# Patient Record
Sex: FEMALE | Race: BLACK OR AFRICAN AMERICAN | NOT HISPANIC OR LATINO | Employment: FULL TIME | URBAN - METROPOLITAN AREA
[De-identification: names, ages, dates, MRNs, and addresses within clinical notes are randomized per-mention and may not be internally consistent; named-entity substitution may affect disease eponyms.]

---

## 2018-10-04 PROBLEM — R20.0 NUMBNESS AND TINGLING OF RIGHT UPPER AND LOWER EXTREMITY: Status: ACTIVE | Noted: 2018-10-04

## 2018-10-04 PROBLEM — R20.2 NUMBNESS AND TINGLING OF RIGHT UPPER AND LOWER EXTREMITY: Status: ACTIVE | Noted: 2018-10-04

## 2018-10-05 ENCOUNTER — OUTSIDE PLACE OF SERVICE (OUTPATIENT)
Dept: CARDIOLOGY | Facility: CLINIC | Age: 58
End: 2018-10-05
Payer: MEDICAID

## 2018-10-05 PROCEDURE — 93306 TTE W/DOPPLER COMPLETE: CPT | Mod: 26,,, | Performed by: INTERNAL MEDICINE

## 2018-10-25 ENCOUNTER — LAB VISIT (OUTPATIENT)
Dept: LAB | Facility: HOSPITAL | Age: 58
End: 2018-10-25
Attending: PSYCHIATRY & NEUROLOGY
Payer: MEDICAID

## 2018-10-25 ENCOUNTER — OFFICE VISIT (OUTPATIENT)
Dept: NEUROLOGY | Facility: CLINIC | Age: 58
End: 2018-10-25
Payer: MEDICAID

## 2018-10-25 VITALS — DIASTOLIC BLOOD PRESSURE: 77 MMHG | SYSTOLIC BLOOD PRESSURE: 118 MMHG | WEIGHT: 203.5 LBS | HEART RATE: 91 BPM

## 2018-10-25 DIAGNOSIS — G45.9 TIA (TRANSIENT ISCHEMIC ATTACK): ICD-10-CM

## 2018-10-25 DIAGNOSIS — G45.9 TIA (TRANSIENT ISCHEMIC ATTACK): Primary | ICD-10-CM

## 2018-10-25 LAB
ESTIMATED AVG GLUCOSE: 108 MG/DL
HBA1C MFR BLD HPLC: 5.4 %

## 2018-10-25 PROCEDURE — 99213 OFFICE O/P EST LOW 20 MIN: CPT | Mod: PBBFAC,PO | Performed by: PSYCHIATRY & NEUROLOGY

## 2018-10-25 PROCEDURE — 99214 OFFICE O/P EST MOD 30 MIN: CPT | Mod: S$PBB,,, | Performed by: PSYCHIATRY & NEUROLOGY

## 2018-10-25 PROCEDURE — 36415 COLL VENOUS BLD VENIPUNCTURE: CPT

## 2018-10-25 PROCEDURE — 83036 HEMOGLOBIN GLYCOSYLATED A1C: CPT

## 2018-10-25 PROCEDURE — 99999 PR PBB SHADOW E&M-EST. PATIENT-LVL III: CPT | Mod: PBBFAC,,, | Performed by: PSYCHIATRY & NEUROLOGY

## 2018-10-25 RX ORDER — FOLIC ACID 1 MG/1
1 TABLET ORAL DAILY
COMMUNITY

## 2018-10-25 RX ORDER — LOSARTAN POTASSIUM 50 MG/1
50 TABLET ORAL DAILY
COMMUNITY
End: 2021-05-07

## 2018-10-25 RX ORDER — ASPIRIN 325 MG
81 TABLET, DELAYED RELEASE (ENTERIC COATED) ORAL DAILY
COMMUNITY

## 2018-10-25 RX ORDER — ATORVASTATIN CALCIUM 40 MG/1
40 TABLET, FILM COATED ORAL NIGHTLY
COMMUNITY

## 2018-10-25 NOTE — PATIENT INSTRUCTIONS
Transient Ischemic Attack (TIA)     Your symptoms were caused by a TIA, or mini-stroke. Even though your symptoms have gone away, this condition is as serious as a full stroke. It means you are more likely to have a full stroke. About 1 in 3 people who have a TIA go on to have a full stroke. And 4% to 10% of those people will have the stroke within 2 days.  A TIA is caused when something decreases or blocks blood flow to a part of your brain. A TIA often happens when a blood clot travels to a blood vessel in the brain. The clot reduces or blocks blood flow. This causes the symptoms you had. After a short while, the clot dissolves. Blood flows again, and the symptoms go away. People with hardening of the arteries (atherosclerosis) are at higher risk for a TIA. So are people who have an irregular heartbeat called atrial fibrillation.  A TIA causes symptoms similar to a stroke, but they last less than 24 hours. A full stroke causes symptoms that last more than 24 hours and may be permanent. But even if your symptoms only lasted a short time, the TIA may have damaged your brain tissue. Once you have had a TIA, you are at risk of having a full stroke. You will need tests to look at the blood flow to your brain. The tests can also rule out other causes of your symptoms. The tests may include an ultrasound of the arteries in your neck and an evaluation of your heart. They may also include a CT scan of your brain, an MRI scan of your brain, or both. If your healthcare provider finds problems, he or she will recommend treatment with medicines, procedures, or both.  Your provider may prescribe medicines to reduce your chance of having another TIA and stroke. These may include medicines that prevent blood clots, such as antiplatelet medicines and blood thinners (anticoagulants). Your doctor may recommend other treatment. This may include a procedure to open up a blocked artery in your neck.  Home care  The following  guidelines will help you take care of yourself at home:  · Take any medicines your doctor has prescribed as directed. These may include antiplatelet medicines or medicines for other conditions, such as high blood pressure or high cholesterol.  · A TIA is a serious event that puts you at risk of having a full stroke. Because of this, it is important to take steps to help prevent a stroke from happening. Your doctor will look at all of your risk factors when deciding on what other treatment you may need.  Ways to reduce your risk for stroke  High blood pressure, diabetes, high cholesterol, heavy drinking, and smoking are risk factors for stroke and heart disease. You can control these by taking medicines and making diet and lifestyle changes. One way to help prevent a stroke is to take aspirin or a similar medicine every day. But don't take daily aspirin unless your healthcare provider tells you to.  Your provider will work with you to make lifestyle changes to help prevent a stroke.  Diet  Your healthcare provider will give you information about changes you may need to make to your diet. You may need to see a registered dietitian for help with diet changes. Changes may include:  · Eating less fat and cholesterol  · Eating less salt (sodium). This is especially important if you have high blood pressure.  · Eating more fresh fruits and vegetables  · Eating lean proteins, such as fish, poultry, beans, and peas  · Eating less red meat and processed meats  · Using low-fat dairy products  · Using vegetable and nut oils in limited amounts  · Limiting how many sweets and processed foods such as chips, cookies, and baked goods you eat  · Limiting how much alcohol you drink  Physical activity  Your healthcare provider may recommend that you get more exercise if you have not been as active as possible. He or she may suggest that you get 40 minutes of moderate to vigorous physical activity each day. You should do this at least 3  to 4 days a week. A few examples of moderate to vigorous exercise are:  · Walking at a brisk pace, about 3 to 4 miles per hour  · Jogging or running  · Swimming or water aerobics  · Hiking  · Dancing  · Martial arts  · Tennis  · Riding a bike  Other ways to reduce your risk  · Weight management. If you are overweight or obese, your healthcare provider will work with you to lose weight and lower your body mass index (BMI) to a normal or near-normal level. Making diet changes and increasing physical activity can help.  · Smoking. If you smoke, break the habit. Enroll in a stop smoking program to improve your chances of success.  · Stress. Learn how to manage your stress. This will help you deal with stress at home and at work.  Follow-up care  Call your doctor for an appointment in the next few days for another evaluation, or as advised. This is to make a plan for preventing another TIA or stroke. You may need to see a neurologist to follow up on your TIA. A neurologist is a doctor who specializes in treating brain and nervous system problems. You may need other tests or procedures.  If you had an X-ray, CT scan, MRI scan, or ECG (electrocardiogram), a specialist will review it. Youll be told of any new findings that will affect your care.  Call 911  Call 911 if any of these occur:  · Any of your TIA symptoms return  · New problems with speech, vision, walking, or weakness or numbness of the face or on one side of the body  · Severe headache, fainting spell, dizziness, or seizure  Date Last Reviewed: 10/1/2016  © 7009-6577 [employer]. [address] [place] [address], [place] [address]. All rights reserved. This information is not intended as a substitute for professional medical care. Always follow your healthcare professional's instructions.      Symptoms of a Stroke     A sudden feeling of weakness on one side of your body may be a sign that you are having a stroke.   During a stroke, blood stops flowing to  part of the brain. This can damage areas in the brain that control the rest of the body. Get help right away if any of these symptoms come on suddenly, even if the symptoms dont last.  Know the symptoms of a stroke  · Weakness. You may feel a sudden weakness, tingling, or a loss of feeling on 1 side of your face or body including your arm or leg.   · Vision problems. You may have sudden double vision or trouble seeing in 1 or both eyes.  · Speech problems. You may have sudden trouble talking, slurred speech, or problems understanding others.  · Headache. You may have a sudden, severe headache.  · Movement problems. You may have sudden trouble walking, dizziness, a feeling of spinning, a loss of balance, a feeling of falling, or blackouts.  · Seizure. You may also have a seizure with a large or hemorrhagic stroke.   Remember: If you have any of these symptoms, call 911 and your doctor as soon as possible.  F.A.S.T. is an easy way to remember the signs of a stroke. When you see these signs, you will know that you need to call 911 fast.   F.A.S.T. stands for:  · F is for face drooping - One side of the face is drooping or numb. When the person smiles, the smile is uneven.  · A is for arm weakness - One arm is weak or numb. When the person lifts both arms at the same time, one arm may drift downward.  · S is for speech difficulty - You may notice slurred speech or difficulty speaking. The person can't repeat a simple sentence correctly when asked.  · T is for time to dial 911 - If someone shows any of these symptoms, even if they go away, call 911 immediately. Make note of the time the symptoms first appeared.   Date Last Reviewed: 8/26/2015  © 2974-8876 [employer]. [address] [place] [address], [place] [address]. All rights reserved. This information is not intended as a substitute for professional medical care. Always follow your healthcare professional's instructions.

## 2018-10-25 NOTE — PROGRESS NOTES
Mouth tingling, OCT 4th- r sided tingling lips  Couldn't move R foot      TTE and Carotids WNL    Still feels subjective numbness  Going to physical therapy

## 2018-10-25 NOTE — PROGRESS NOTES
[facility] NEUROLOGY  [facility] [place]    Date: 10/25/18  Patient Name: Aaliyah Duran   MRN: [id number]   PCP: Primary Doctor No  Referring Provider: Lidia Damon MD    Assessment:   Aaliyah Duran is a 58 y.o. female presenting for evaluation of recent TIA.  Risk stratification reviewed as discussed below.  Will obtain hemoglobin A1c to complete workup.  Patient will continue current aspirin and statin therapy.    Plan:     Problem List Items Addressed This Visit     None      Visit Diagnoses     TIA (transient ischemic attack)    -  Primary    Relevant Orders    Hemoglobin A1c          Ruddy Poole MD  [facility]   Department of Neurology    Patient note was created using MModal Dictation.  Any errors in syntax or even information may not have been identified and edited on initial review prior to signing this note.  Subjective:   Patient seen in consultation at the request of Lidia Damon MD for the evaluation of TIA. A copy of this note will be sent to the referring physician.        HPI:   Ms. Aaliyah Duran is a 58 y.o. female who presents with a chief complaint of Presenting in follow-up for TIA.  On October 4th patient experienced abrupt onset of left facial numbness and tingling with right leg clumsiness.  She presented to her local ER who reassured her and sent the patient home.  Her symptoms recurred several hours later and she presented to the ER again was evaluated for possible TIA.  MRI brain (personally reviewed) at that time was normal. She also underwent imaging which revealed significant stenosis (personally reviewed).  Her LDL was 49 and she is compliant with statin as well as therapy.  She has of her symptoms.  She does not smoke.     PAST MEDICAL HISTORY:  History reviewed. No pertinent past medical history.    PAST SURGICAL HISTORY:  History reviewed. No pertinent surgical history.    CURRENT MEDS:  Current Outpatient Medications   Medication Sig Dispense  Refill    aspirin (ECOTRIN) 325 MG EC tablet Take 325 mg by mouth once daily.      atorvastatin (LIPITOR) 40 MG tablet Take 40 mg by mouth every evening.      folic acid (FOLVITE) 1 MG tablet Take 1 mg by mouth once daily.      losartan (COZAAR) 50 MG tablet Take 50 mg by mouth once daily.       No current facility-administered medications for this visit.      ALLERGIES:  Review of patient's allergies indicates:  No Known Allergies    FAMILY HISTORY:  History reviewed. No pertinent family history.    SOCIAL HISTORY:  Social History     Tobacco Use    Smoking status: Not on file   Substance Use Topics    Alcohol use: Not on file    Drug use: Not on file     Review of Systems:  12 review of systems is negative except for the symptoms mentioned in HPI.      Objective:     Vitals:    10/25/18 1024   BP: 118/77   Pulse: 91   Weight: 92.3 kg (203 lb 7.8 oz)     General: NAD, well nourished   Eyes: no tearing, discharge, no erythema   ENT: moist mucous membranes of the oral cavity, nares patent    Neck: Supple, full range of motion  Cardiovascular: Warm and well perfused, pulses equal and symmetrical  Lungs: Normal work of breathing, normal chest wall excursions  Skin: No rash, lesions, or breakdown on exposed skin  Psychiatry: Mood and affect are appropriate   Abdomen: soft, non tender, non distended  Extremeties: No cyanosis, clubbing or edema.    Neurological   MENTAL STATUS: Alert and oriented to person, place, and time. Attention and concentration within normal limits. Speech without dysarthria, able to name and repeat without difficulty. Recent and remote memory within normal limits   CRANIAL NERVES: Visual fields intact. PERRL. EOMI. Facial sensation intact. Face symmetrical. Hearing grossly intact. Full shoulder shrug bilaterally. Tongue protrudes midline   SENSORY: Sensation is intact to light touch throughout.    MOTOR: Normal bulk and tone. 5/5 deltoid, biceps, triceps, interosseous, hand   bilaterally. 5/5 iliopsoas, knee extension/flexion, foot dorsi/plantarflexion bilaterally.    REFLEXES: Symmetric and 2+ throughout.   CEREBELLAR/COORDINATION/GAIT: Gait steady with normal arm swing and stride length.   Finger to nose intact. Normal rapid alternating movements.

## 2018-10-25 NOTE — LETTER
October 25, 2018      Lidia Damon MD  [address] [place] [id number]           [address] [place] [address]  Phone: [phone]  Fax: [phone]          Patient: Aaliyah Duran   MR Number: [id number]   YOB: 1960   Date of Visit: 10/25/2018       Dear Dr. Lidia Damon:    Thank you for referring Aaliyah Duran to me for evaluation. Attached you will find relevant portions of my assessment and plan of care.    If you have questions, please do not hesitate to call me. I look forward to following Aaliyah Duran along with you.    Sincerely,    Ruddy Poole MD    Enclosure  CC:  No Recipients    If you would like to receive this communication electronically, please contact [email or web address] or [phone] to request more information on [employer] Link access.    For providers and/or their staff who would like to refer a patient to [facility], please contact us through our one-stop-shop provider referral line, [facility], at [phone].    If you feel you have received this communication in error or would no longer like to receive these types of communications, please e-mail [email or web address]

## 2018-11-28 PROBLEM — Z86.73 HISTORY OF STROKE: Status: ACTIVE | Noted: 2018-11-28

## 2018-12-31 ENCOUNTER — OUTSIDE PLACE OF SERVICE (OUTPATIENT)
Dept: CARDIOLOGY | Facility: CLINIC | Age: 58
End: 2018-12-31
Payer: MEDICAID

## 2018-12-31 PROCEDURE — 93010 ELECTROCARDIOGRAM REPORT: CPT | Mod: S$GLB,,, | Performed by: INTERNAL MEDICINE

## 2018-12-31 PROCEDURE — 93010 PR ELECTROCARDIOGRAM REPORT: ICD-10-PCS | Mod: S$GLB,,, | Performed by: INTERNAL MEDICINE

## 2019-06-12 ENCOUNTER — OFFICE VISIT (OUTPATIENT)
Dept: CARDIOLOGY | Facility: CLINIC | Age: 59
End: 2019-06-12
Payer: MEDICAID

## 2019-06-12 VITALS
DIASTOLIC BLOOD PRESSURE: 78 MMHG | BODY MASS INDEX: 27.05 KG/M2 | HEIGHT: 62 IN | SYSTOLIC BLOOD PRESSURE: 157 MMHG | HEART RATE: 80 BPM | WEIGHT: 147 LBS

## 2019-06-12 DIAGNOSIS — R55 SYNCOPE AND COLLAPSE: Primary | ICD-10-CM

## 2019-06-12 DIAGNOSIS — G45.9 TIA (TRANSIENT ISCHEMIC ATTACK): ICD-10-CM

## 2019-06-12 PROCEDURE — 99204 OFFICE O/P NEW MOD 45 MIN: CPT | Mod: S$GLB,,, | Performed by: STUDENT IN AN ORGANIZED HEALTH CARE EDUCATION/TRAINING PROGRAM

## 2019-06-12 PROCEDURE — 99204 PR OFFICE/OUTPT VISIT, NEW, LEVL IV, 45-59 MIN: ICD-10-PCS | Mod: S$GLB,,, | Performed by: STUDENT IN AN ORGANIZED HEALTH CARE EDUCATION/TRAINING PROGRAM

## 2019-06-12 NOTE — PROGRESS NOTES
"   Cardiology Clinic note    Subjective:   Patient ID:  Aaliyah Duran is a 58 y.o. female who presents for evaluation of syncope    HPI:   Aaliyah Duran 58 F with hx of HTN, possible TIA who presents for eval of syncope    Syncopal episode occurred 5/15/19: Pt reported eye sx (ptgerium removal). She finished procedure. Pt notes she was NPO for most of the day. After the procedure she was hungry and went to a restaurant. While getting food and became dizzy and lightheaded and had to sit down. No syncopal episode. She felt fine before EMS came. Pt reports feels fine, with no complaints at this time. No prior hx of syncope, lightheadedness      She does not remember losing consciousness. She was found to be hypotensive by EMS on scene. Given IVF. She reports feeling normal at this time and no previous illness.    PCP: Dr. Phil Nur  BP (!) 157/78 (BP Location: Right arm, Patient Position: Sitting, BP Method: Medium (Manual))   Pulse 80   Ht 5' 2" (1.575 m)   Wt 66.7 kg (147 lb)   BMI 26.89 kg/m²       Patient Active Problem List    Diagnosis Date Noted    History of stroke 11/28/2018    Numbness and tingling of right upper and lower extremity 10/04/2018       Patient's Medications   New Prescriptions    No medications on file   Previous Medications    ASPIRIN (ECOTRIN) 325 MG EC TABLET    Take 325 mg by mouth once daily.    ATORVASTATIN (LIPITOR) 40 MG TABLET    Take 40 mg by mouth every evening.    FOLIC ACID (FOLVITE) 1 MG TABLET    Take 1 mg by mouth once daily.    LOSARTAN (COZAAR) 50 MG TABLET    Take 50 mg by mouth once daily.   Modified Medications    No medications on file   Discontinued Medications    No medications on file      Left Arm BP - Sitting: (P) 135/80  ROS      Objective:   Physical Exam    Lab Results    Lab Results   Component Value Date    HGBA1C 5.4 10/25/2018       Lipid panel  No results found for: CHOL  No results found for: HDL  No results found for: LDLCALC  No results found " for: TRIG    Cardiac Studies  Significant Imaging: EKG: NSR    Cath study : n/a    Assessment:     1. Syncope and collapse    2. TIA (transient ischemic attack)        Plan:     1. Atypical presentation for cardiogenic syncope  - will get echo due to hx of CVA/TIA and HTN- to r/o PFO/ASD, VHD and LVH  - will schedule follow appt if abn echo, otherwise, ok to follow up with PCP if echo is WNL    2. TIA  - take aspirin 81mg po, not 325, more bleeding risk  - c/w statin    3. HTN  - mildly controlled,  mx per pcp  - c/w home medications    Continue with current medical plan and lifestyle changes.  Return sooner for concerns or questions. If symptoms persist go to the ED  Total duration of face to face visit time 30 minutes.  Total time spent counseling greater than fifty percent of total visit time.  Counseling included discussion regarding imaging findings, diagnosis, possibilities, treatment options, risks and benefits.      Orders Placed This Encounter   Procedures    Transthoracic echo (TTE) 2D with Color Flow     R/o PFO/asd,   hx of syncope/tia     Standing Status:   Future     Standing Expiration Date:   6/12/2020     Order Specific Question:   Test type     Answer:   Color     Order Specific Question:   Test type     Answer:   Doppler     Order Specific Question:   Test type     Answer:   Contrast     Order Specific Question:   Type of contrast     Answer:   Agitated saline       Follow up as scheduled. Return to clinic in PRN, if echo is abn   She expressed verbal understanding and agreed with the plan    Thank you for the opportunity to care for this patient. Will be available for questions if needed.     Riccardo Toure MD [facility]  Interventional Cardiology  [facility]  Pager: [phone]

## 2019-06-12 NOTE — LETTER
June 12, 2019      Phil Ugalde MD  [address] [address]  [place] [address]           [facility] - Cardiology  [address] [address]  Phone: [phone]  Fax: [phone]          Patient: Aaliyah Duran   MR Number: [id number]   YOB: 1960   Date of Visit: 6/12/2019       Dear Dr. Phil Ugalde:    Thank you for referring Aaliyah Duran to me for evaluation. Attached you will find relevant portions of my assessment and plan of care.    If you have questions, please do not hesitate to call me. I look forward to following Aaliyah Duran along with you.    Sincerely,    Riccardo Toure MD    Enclosure  CC:  No Recipients    If you would like to receive this communication electronically, please contact [email or web address] or [phone] to request more information on [employer] Link access.    For providers and/or their staff who would like to refer a patient to [facility], please contact us through our one-stop-shop provider referral line, [facility], at [phone].    If you feel you have received this communication in error or would no longer like to receive these types of communications, please e-mail [email or web address]

## 2019-06-19 ENCOUNTER — CLINICAL SUPPORT (OUTPATIENT)
Dept: CARDIOLOGY | Facility: CLINIC | Age: 59
End: 2019-06-19
Attending: STUDENT IN AN ORGANIZED HEALTH CARE EDUCATION/TRAINING PROGRAM
Payer: MEDICAID

## 2019-06-19 DIAGNOSIS — R55 SYNCOPE, UNSPECIFIED SYNCOPE TYPE: ICD-10-CM

## 2019-06-19 LAB
AORTIC VALVE CUSP SEPERATION: 2 CM
AV MEAN GRADIENT: 10 MMHG
AV PEAK GRADIENT: 4.84 MMHG
CV ECHO LV RWT: 0.52 CM
DOP CALC AO PEAK VEL: 1.1 M/S
DOP CALC AO VTI: 19.8 CM
DOP CALC MV VTI: 18 CM
ECHO EF ESTIMATED: 58 %
ECHO LV POSTERIOR WALL: 1.1 CM (ref 0.6–1.1)
FRACTIONAL SHORTENING: 31 % (ref 28–44)
INTERVENTRICULAR SEPTUM: 0.9 CM (ref 0.6–1.1)
IVC OSTIUM: 1.3 CM
LA MAJOR: 4.3 CM
LA WIDTH: 2.8 CM
LEFT INTERNAL DIMENSION IN SYSTOLE: 2.9 CM (ref 2.1–4)
LEFT VENTRICULAR INTERNAL DIMENSION IN DIASTOLE: 4.2 CM (ref 3.5–6)
LEFT VENTRICULAR MASS: 137.25 G
MV MEAN GRADIENT: 1 MMHG
MV PEAK GRADIENT: 2 MMHG
MV STENOSIS PRESSURE HALF TIME: 51 MS
MV VALVE AREA P 1/2 METHOD: 4.31 CM2
PISA TR MAX VEL: 2.3 M/S
PV MEAN GRADIENT: 1 MMHG
PV PEAK VELOCITY: 0.66 CM/S
RA MAJOR: 4.3 CM
RA PRESSURE: 3 MMHG
RA WIDTH: 3 CM
RIGHT VENTRICULAR END-DIASTOLIC DIMENSION: 1.8 CM
SINUS: 2.4 CM
STJ: 2.4 CM
TR MAX PG: 21.16 MMHG
TRICUSPID ANNULAR PLANE SYSTOLIC EXCURSION: 1.8 CM
TV REST PULMONARY ARTERY PRESSURE: 24 MMHG

## 2019-06-19 PROCEDURE — 93306 TRANSTHORACIC ECHO (TTE) COMPLETE (CUPID ONLY): ICD-10-PCS | Mod: 26,,, | Performed by: STUDENT IN AN ORGANIZED HEALTH CARE EDUCATION/TRAINING PROGRAM

## 2019-06-19 PROCEDURE — 93306 TTE W/DOPPLER COMPLETE: CPT | Mod: 26,,, | Performed by: STUDENT IN AN ORGANIZED HEALTH CARE EDUCATION/TRAINING PROGRAM

## 2020-02-15 ENCOUNTER — OUTSIDE PLACE OF SERVICE (OUTPATIENT)
Dept: CARDIOLOGY | Facility: CLINIC | Age: 60
End: 2020-02-15
Payer: MEDICAID

## 2020-02-15 PROCEDURE — 93010 ELECTROCARDIOGRAM REPORT: CPT | Mod: S$GLB,,, | Performed by: INTERNAL MEDICINE

## 2020-02-15 PROCEDURE — 93010 PR ELECTROCARDIOGRAM REPORT: ICD-10-PCS | Mod: S$GLB,,, | Performed by: INTERNAL MEDICINE

## 2020-02-16 ENCOUNTER — OUTSIDE PLACE OF SERVICE (OUTPATIENT)
Dept: CARDIOLOGY | Facility: CLINIC | Age: 60
End: 2020-02-16
Payer: MEDICAID

## 2020-02-16 PROCEDURE — 93010 PR ELECTROCARDIOGRAM REPORT: ICD-10-PCS | Mod: S$GLB,,, | Performed by: INTERNAL MEDICINE

## 2020-02-16 PROCEDURE — 93010 ELECTROCARDIOGRAM REPORT: CPT | Mod: S$GLB,,, | Performed by: INTERNAL MEDICINE

## 2021-05-07 RX ORDER — TIMOLOL MALEATE 5 MG/ML
SOLUTION/ DROPS OPHTHALMIC
COMMUNITY
Start: 2020-08-11

## 2021-05-07 RX ORDER — CARVEDILOL 3.12 MG/1
3.12 TABLET ORAL 2 TIMES DAILY
COMMUNITY
Start: 2020-11-19 | End: 2022-06-20

## 2021-06-14 ENCOUNTER — OFFICE VISIT (OUTPATIENT)
Dept: CARDIOLOGY | Facility: CLINIC | Age: 61
End: 2021-06-14
Payer: MEDICAID

## 2021-06-14 VITALS
HEIGHT: 65 IN | SYSTOLIC BLOOD PRESSURE: 135 MMHG | BODY MASS INDEX: 21.29 KG/M2 | OXYGEN SATURATION: 95 % | WEIGHT: 127.81 LBS | HEART RATE: 74 BPM | DIASTOLIC BLOOD PRESSURE: 74 MMHG

## 2021-06-14 DIAGNOSIS — Z86.73 HISTORY OF STROKE: Primary | ICD-10-CM

## 2021-06-14 DIAGNOSIS — I10 ESSENTIAL HYPERTENSION: Chronic | ICD-10-CM

## 2021-06-14 PROCEDURE — 99214 PR OFFICE/OUTPT VISIT, EST, LEVL IV, 30-39 MIN: ICD-10-PCS | Mod: S$GLB,,, | Performed by: INTERNAL MEDICINE

## 2021-06-14 PROCEDURE — 99214 OFFICE O/P EST MOD 30 MIN: CPT | Mod: S$GLB,,, | Performed by: INTERNAL MEDICINE

## 2022-06-20 ENCOUNTER — TELEPHONE (OUTPATIENT)
Dept: CARDIOLOGY | Facility: CLINIC | Age: 62
End: 2022-06-20
Payer: MEDICAID

## 2022-06-20 ENCOUNTER — OFFICE VISIT (OUTPATIENT)
Dept: CARDIOLOGY | Facility: CLINIC | Age: 62
End: 2022-06-20
Payer: MEDICAID

## 2022-06-20 VITALS
WEIGHT: 162.81 LBS | OXYGEN SATURATION: 98 % | HEIGHT: 65 IN | SYSTOLIC BLOOD PRESSURE: 149 MMHG | HEART RATE: 76 BPM | BODY MASS INDEX: 27.13 KG/M2 | DIASTOLIC BLOOD PRESSURE: 72 MMHG

## 2022-06-20 DIAGNOSIS — G45.9 TIA (TRANSIENT ISCHEMIC ATTACK): ICD-10-CM

## 2022-06-20 DIAGNOSIS — Z86.73 HISTORY OF STROKE: ICD-10-CM

## 2022-06-20 DIAGNOSIS — I10 ESSENTIAL HYPERTENSION: Primary | ICD-10-CM

## 2022-06-20 PROCEDURE — 4010F PR ACE/ARB THEARPY RXD/TAKEN: ICD-10-PCS | Mod: CPTII,S$GLB,, | Performed by: INTERNAL MEDICINE

## 2022-06-20 PROCEDURE — 3077F PR MOST RECENT SYSTOLIC BLOOD PRESSURE >= 140 MM HG: ICD-10-PCS | Mod: CPTII,S$GLB,, | Performed by: INTERNAL MEDICINE

## 2022-06-20 PROCEDURE — 99214 PR OFFICE/OUTPT VISIT, EST, LEVL IV, 30-39 MIN: ICD-10-PCS | Mod: S$GLB,,, | Performed by: INTERNAL MEDICINE

## 2022-06-20 PROCEDURE — 99214 OFFICE O/P EST MOD 30 MIN: CPT | Mod: S$GLB,,, | Performed by: INTERNAL MEDICINE

## 2022-06-20 PROCEDURE — 3077F SYST BP >= 140 MM HG: CPT | Mod: CPTII,S$GLB,, | Performed by: INTERNAL MEDICINE

## 2022-06-20 PROCEDURE — 1159F PR MEDICATION LIST DOCUMENTED IN MEDICAL RECORD: ICD-10-PCS | Mod: CPTII,S$GLB,, | Performed by: INTERNAL MEDICINE

## 2022-06-20 PROCEDURE — 4010F ACE/ARB THERAPY RXD/TAKEN: CPT | Mod: CPTII,S$GLB,, | Performed by: INTERNAL MEDICINE

## 2022-06-20 PROCEDURE — 1159F MED LIST DOCD IN RCRD: CPT | Mod: CPTII,S$GLB,, | Performed by: INTERNAL MEDICINE

## 2022-06-20 PROCEDURE — 3008F PR BODY MASS INDEX (BMI) DOCUMENTED: ICD-10-PCS | Mod: CPTII,S$GLB,, | Performed by: INTERNAL MEDICINE

## 2022-06-20 PROCEDURE — 3078F PR MOST RECENT DIASTOLIC BLOOD PRESSURE < 80 MM HG: ICD-10-PCS | Mod: CPTII,S$GLB,, | Performed by: INTERNAL MEDICINE

## 2022-06-20 PROCEDURE — 3008F BODY MASS INDEX DOCD: CPT | Mod: CPTII,S$GLB,, | Performed by: INTERNAL MEDICINE

## 2022-06-20 PROCEDURE — 3078F DIAST BP <80 MM HG: CPT | Mod: CPTII,S$GLB,, | Performed by: INTERNAL MEDICINE

## 2022-06-20 RX ORDER — LOSARTAN POTASSIUM 25 MG/1
25 TABLET ORAL DAILY
Qty: 30 TABLET | Refills: 11 | Status: SHIPPED | OUTPATIENT
Start: 2022-06-20 | End: 2023-01-23

## 2022-06-20 RX ORDER — LOSARTAN POTASSIUM 25 MG/1
25 TABLET ORAL DAILY
Qty: 90 TABLET | Refills: 3 | Status: SHIPPED | OUTPATIENT
Start: 2022-06-20 | End: 2022-06-20

## 2022-06-20 RX ORDER — CARVEDILOL 6.25 MG/1
6.25 TABLET ORAL 2 TIMES DAILY WITH MEALS
Qty: 60 TABLET | Refills: 11 | Status: SHIPPED | OUTPATIENT
Start: 2022-06-20 | End: 2023-01-23

## 2022-06-20 NOTE — PROGRESS NOTES
Subjective:   @Patient ID:  Aaliyah Duran is a 61 y.o. female who presents for follow-up of HTN       HPI:   Here for f/u   She has been doing ok   No chest pain, no significant SYLVESTER  She doesn't monitor her BP at home  Compliant with her medications      Historically:  hx of HTN, possible TIA      Prior cardiovascular  Hx  --------------------------------         - ECHO 6/19/2019   · Low normal left ventricular systolic function. The estimated ejection fraction is 50%  · Normal LV diastolic function left ventricular diastolic dysfunction.  · Normal right ventricular systolic function.  · Mild tricuspid regurgitation.  · Mild mitral regurgitation.  · The estimated PA systolic pressure is 24 mm Hg  · Normal central venous pressure (3 mm Hg).         - EKG 6/14/2021 Sr, with no acute ST-T changes. Low voltage qrs           Patient Active Problem List    Diagnosis Date Noted    Essential hypertension 06/14/2021    Syncope and collapse 06/12/2019    TIA (transient ischemic attack) 06/12/2019    History of stroke 11/28/2018    Numbness and tingling of right upper and lower extremity 10/04/2018                    LAST HbA1c  Lab Results   Component Value Date    HGBA1C 5.4 10/25/2018       Lipid panel  No results found for: CHOL  No results found for: HDL  No results found for: LDLCALC  No results found for: TRIG  No results found for: CHOLHDL         Review of Systems   Constitutional: Negative for chills and fever.   HENT: Negative for hearing loss and nosebleeds.    Eyes: Negative for blurred vision.   Cardiovascular: Negative for chest pain, leg swelling and palpitations.   Respiratory: Negative for hemoptysis and shortness of breath.    Hematologic/Lymphatic: Negative for bleeding problem.   Skin: Negative for itching.   Musculoskeletal: Negative for falls.   Gastrointestinal: Negative for abdominal pain and hematochezia.   Genitourinary: Negative for hematuria.   Neurological: Negative for dizziness and loss of  balance.        Hx of CVA affected the rt side    Psychiatric/Behavioral: Negative for altered mental status and depression.       Objective:   Physical Exam  Constitutional:       Appearance: She is well-developed.   HENT:      Head: Normocephalic and atraumatic.   Eyes:      Conjunctiva/sclera: Conjunctivae normal.   Neck:      Vascular: No carotid bruit or JVD.   Cardiovascular:      Rate and Rhythm: Normal rate and regular rhythm.      Pulses:           Carotid pulses are 2+ on the right side and 2+ on the left side.       Radial pulses are 2+ on the right side and 2+ on the left side.      Heart sounds: Normal heart sounds. No murmur heard.    No friction rub. No gallop.   Pulmonary:      Effort: Pulmonary effort is normal. No respiratory distress.      Breath sounds: Normal breath sounds. No stridor. No wheezing.   Musculoskeletal:      Cervical back: Neck supple.   Skin:     General: Skin is warm and dry.   Neurological:      Mental Status: She is alert and oriented to person, place, and time.   Psychiatric:         Behavior: Behavior normal.         Assessment:     1. Essential hypertension    2. History of stroke    3. TIA (transient ischemic attack)        Plan:   - BP is not well controlled. 170s/80s on the Rt and 150s/70s on the left.   - Will add losartan. Will increase Coreg to 6.25 mg bid.   - Refer to digital HTN clinic  - Will consider evaluation for subclavian stenosis in the future given discrepancy bewteen b/l UEs.     - Continue current medications  - BB/Statin/ASA   I spent 5-10 minutes asking, assessing, assisting, arranging and advising heart healthy diet improvements. This included low-salt meals, portion control and health food alternatives. I also encourage 30 minutes of moderate exercise 3-4x a week.     Pertinent cardiac images and EKG reviewed independently.    Continue with current medical plan and lifestyle changes.  Return sooner for concerns or questions. If symptoms persist go to  the ED  I have reviewed all pertinent data including patient's medical history in detail and updated the computerized patient record.     Orders Placed This Encounter   Procedures    NURSING COMMUNICATION: [facility]    [employer] (HDMP) Enrollment Order     I. PURPOSE  To provide [facility] patients with innovative, specialized blood pressure monitoring and optimal dosing of antihypertensive therapy to improve health outcomes and decrease microvascular and macrovascular complications    II. GOALS  To maintain a systematic, coordinated and cost-effective process to monitor blood pressure in patients with hypertension  To attain and maintain optimal antihypertensive therapy while ensuring patient safety using the most recent evidence-based guidelines for the management of hypertension as reported by AHA/ACC in 2017.   Provide consultative services to providers, patients and caregivers regarding optimal antihypertensive therapy  To improve patient/caregiver understanding and compliance related to antihypertensive therapies by providing continuous patient and caregiver education about their prescribed medications and associated disease state  To provide education, guidance and reinforcement regarding lifestyle modifications including weight loss, adopting and maintaining the Dietary Approaches to Stop Hypertension or DASH diet, sodium restriction, physical activity, and moderation of alcohol consumption to patients and caregivers  Allow providers increased availability of clinic time for direct patient care   To provide patient care and education within an interdisciplinary framework and enhance partnering with other members of health care team  Improve continuity of care for high blood pressure patients and improve patient engagement  Collect and utilize pharmacy related outcomes to improve quality of patient care    III. COLLABORATIVE PRACTICE AGREEMENT    A.  Under this  collaborative practice agreement, an [facility] pharmacist according to and in compliance with [place] Board of Pharmacy Title 46, Part LIII, section 523 and [place] Board of Medical Examiners definition of the Collaborative Drug Therapy Management (CDTM) may initiate, implement, alter and monitor a therapeutic drug plan intended to manage antihypertensive therapy.  Services offered by the hypertension pharmacy specialist may include education on disease state and lifestyle modification, in addition to the drug therapy services listed above. Written and audio/visual educational materials and patient specific information may be provided to improve quality of care.    B. Primary Collaborating Physician:    Primary Physician: Ej Farah M.D., [facility], [place]  , Cardiology  License number: [id number]  CDTM number:  [id number]  Telephone number: [phone]   E-mail address: [email or web address]  Emergency Contact Information: [phone]    Back-Up Physician:  Forrest Paz M.D.  Cardiology  License number:  [id number]  CDTM number:  [id number]  Telephone number:  [phone]  E-mail address: [email or web address]  Emergency Contact Information: [phone]    C.  Pharmacists:   Each of the following pharmacists will serve as the primary pharmacist on CDTM and any pharmacist may serve as the secondary pharmacist for another pharmacists agreement.  Each of the pharmacists listed below has a Pharm.D degree, with completion of an accredited pharmacy practice residency and as well as experience with managing patients along with a physician.  The outpatient monitoring service will be provided under the Cardiology Department at [facility] [place] location in [place] at [address] [place] [address]. The pharmacist will contact patients via telephone from a remote location.    Pharmacist: Kenna Arroyo, AbD  This pharmacist has completed a pharmacy practice residency and  has practiced clinical pharmacy for 7 years. She has experience in the areas of cardiology, acute care, and managed care. She started a pharmacist run hypertension clinic at [facility] during her residency and was published in The American Journal of Health-System Pharmacists.     [place] Pharmacist License Number: [id number]  CDTM number:  [id number]  Contact Number:  [phone]  Contact E-mail: [email or web address]  Emergency Contact Number:  [phone]    Pharmacist:  Shanda Ayala PharmD  This pharmacist has completed a pharmacy practice residency and has practiced clinical pharmacy for 17 years in the areas of cardiology, geriatric medicine, anticoagulation management, retail and ambulatory care.  She served as a pharmacy practice clinical preceptor and lead pharmacist in anticoagulation clinic for 10 years.  [place] Pharmacist License # [id number]  CDTM number:  [id number]  Contact Number:  [phone]  Contact E-mail:  [email or web address]   Emergency Contact Number:  [phone]    Pharmacist: Christie Hayward PharmD, BCACP, CDE  This pharmacist has completed a pharmacy practice residency with emphasis in ambulatory care and has practiced clinical pharmacy for 8 years in the areas of dyslipidemia, hypertension, diabetes, and anticoagulation. She is also a Certified Diabetes Educator.      [place] Pharmacist License # [id number]  CDTM number: [id number]  Contact number: [phone]  Contact E-mail:  [email or web address]  Emergency Contact Number: [phone]    Pharmacist: Laurence Escobar PharmD  This pharmacist started practicing at [facility] in 2011 following her one year residency at [facility]. She serves as an Adjunct Clinical  in the College of Pharmacy at [employer]. She served as the lead pharmacist for the Coumadin Clinic team for 4 years.   [place] Pharmacist License: [id number]  CDTM number:  [id number]  Contact number: [phone]  Contact E-mail: [email or web address]   Emergency Contact Number: [phone]    Pharmacist:  Eleanor Dawson PharmD  This pharmacist has completed a pharmacy practice residency (PGY-1) and has practiced clinical pharmacy for 16  years in the areas of heart and abdominal transplant, retail and ambulatory care.  She was directly involved in the care of congestive heart failure, left ventricular assist device and heart transplant patients from [id number].  She is currently practicing as a digital medicine clinical specialist in heart failure.    [place] Pharmacist License # [id number]  CDTM number:  [id number]  Contact Number:  [phone]  Contact E-mail:  [email or web address]   Emergency Contact Number:  [phone]    Pharmacist: Laurence Kamara PharmD  This pharmacist obtained her Pharm.D. from [employer], and has completed an Ambulatory Care Pharmacy Practice residency at [facility]. She has practiced clinical pharmacy for 9  years and has experience in the areas of Hypertension and Diabetes.      [place] Pharmacist License: [id number]  CDTM Number: [id number]  Telephone number: [phone]  E-mail: [email or web address]  Emergency Contact Number: [phone]      Pharmacist: Yanet Landeros PharmD  This pharmacist has completed a pharmacy practice residency with an emphasis in ambulatory care and has practiced clinical pharmacy for one year. She has experience in the areas of diabetes, hypertension and dyslipidemia.   [place] Pharmacist License: [id number]  CDTM Number: [id number]  Contact Number: [phone]  Contact Email: [email or web address]   Emergency Contact: [phone]    Pharmacist: Danna Patino PharmD, BCPS  This pharmacist has completed a pharmacy practice residency with an emphasis in ambulatory care and is a Board Certified Pharmacotherapy Specialist (BCPS). She has practiced clinical  pharmacy for  years in areas of ambulatory care, internal medicine, cardiology and specialty pharmacy.    [place] Pharmacist License Number: [id number]  [facility] number:  [id number]  Contact Number:  [phone]  Contact E-mail:  [email or web address]   Emergency Contact Number:  [phone]    D.  Eligible Patients  Patients whose antihypertensive therapy is managed under this agreement must have a diagnosis of hypertension as documented in the patient record, and have established care with a provider within [facility]. All aspects of the patients hypertension medication management will be followed in collaboration with the physician treating the patients hypertension.  The patient will be seen by his or her physician per their discretion or by the recommendation by the hypertension pharmacist.  The patients case may be reviewed with clinic medical personnel as needed, but will be reported at least every 30 days to the collaborating physician regarding the patients drug therapy management. All decisions made by the pharmacist will be recorded in [facility] and are readily available for physician review. All issues outside of the scope of antihypertensive therapy shall be reported to the collaborating physician.     E.  Medications in CDTM  This collaborative practice proposal involves the management of patients who are receiving antihypertensive therapy, specifically, thiazide-type diuretics, angiotensin-converting enzyme inhibtors (ACE-I), angiotensin receptor blockers (ARB), calcium channel blockers (CCB), beta-blockers, loop diuretics, potassium-sparing diuretics, potassium supplementation, aldosterone antagonists, direct renin inhibitors, alpha-1 receptor blockers, central alpha-2 agonists, direct arterial vasodilators and peripheral adrenergic antoagonists, or those patients in which hypertension is controlled by lifestyle modifcation alone.      F.  Clinical Procedure  All patients  will be notified that a hypertension CDTM exists.  A signed physician order will be required for each patient to be enrolled into the hypertension CDTM.  Informed consent and an electronic signature will be obtained from each patient and documented in the patients record.  This patient signature will be valid for 1 year, requiring a new physician order and patient consent yearly.  The patient must also be enrolled in the [employer] communication program (Randa Bandarlydia) to be elegible for the monitoring program.  The following management plan will be utilized for CDTM:    Patients must submit blood pressures at a minimum of once weekly from the patient- purchased wireless blood pressure cuff. Patients who fail to comply with this requirement are subject to removal from [facility].   Evaluate the change in blood pressure, if any, from previous measurements performed on the same cuff and arm.  Determine and document contributing factors for blood pressure change.    Review initial drug therapy made by clinic physician upon program enrollment with patient to ensure compliance.    Identify target blood pressure based on age and comorbidities. Therapeutic changes will be made in collaboration with PharmD and collaborating physician based on the AHA/ACC 2017 guidelines for the treatment of hypertension.  Guide drug optimization based on patient response at 2-4 week intervals until control is achieved.  The PharmD will continue to monitor blood pressure and make adjustments to hypertension medications in order to achieve optimal blood pressure.   Order follow up labs when changing or adding ACE inhibitors and diuretics.    Refer to hypertension specialist if  blood pressure goals cannot be achieved using the noted algorithm.  Notify physician with specific problems or request clinic visit if deemed necessary.  Document antihypertensive therapy changes, interventions, and outcomes in EMR.   Provide patient/caregiver continuous  education or reinforcement regarding hypertension management,  medications and lifestyle modifications.    Laboratory Tests  Pharmacists will be authorized to order and evaluate laboratory tests directly related to the disease specific drug therapy being managed. Pharmacists will also be authorized to order additional specific labs based on patient clinical presentation or description. Pharmacists may also review other lab data available in the patient record which may be necessary for the evaluation and assessment of the impact on antihypertensive therapy (i.e. drug interaction, disease interaction, etc.).     b.  Documentation   Documentation of patient encounters and lab results will be permanently placed in the [facility].  Laboratory results will automatically populate prompting review and assessment of each patient.  Laboratory results obtained from outside laboratories will be entered into EMR manually.  This documentation will include lab values, medication changes, identification and assessment of adverse events related to therapy, antihypertensive medication dose adjustments, therapeutic management plan and follow up as well as any other information given to the patient during the telemedicine visit.    c.     1.   will be carried out through quarterly reports tracking following statistics:   a. Percent of patients requiring a change to antihypertensive medications   b. Number of emergency visits due to hypertensive urgency or emergency, hypotension or medication side effects for participating patients  Percent of patients who are controlled (BP <130/80 mmHg)  and uncontrolled (BP>130 mmHg)     2.  Patient specific quality indicators will be identified through quarterly review of the following data:   a.  Average change in blood pressure after a dose adjument by the hypertension pharmacist    3.  A random sample of patient records shall be reviewed by the primary  physician quarterly to ensure adherence by the hypertension clinical specialists to the collaborative practice agreement.     4.   measures will be reported to Pharmacy & Therapeutics Committee yearly.     References:    QUINTON Canela, et al. 2017. 2017. Guidelines for the Prevention, Detection, Evaluation and Management of High Blood Pressure in Adults.      Order Specific Question:   BP Control Goal     Answer:   Current (2017) AHA Guidelines       Follow up as scheduled.     She expressed verbal understanding and agreed with the plan    Patient's Medications   New Prescriptions    CARVEDILOL (COREG) 6.25 MG TABLET    Take 1 tablet (6.25 mg total) by mouth 2 (two) times daily with meals.    LOSARTAN (COZAAR) 25 MG TABLET    Take 1 tablet (25 mg total) by mouth once daily.   Previous Medications    ASPIRIN (ECOTRIN) 325 MG EC TABLET    Take 81 mg by mouth once daily.    ATORVASTATIN (LIPITOR) 40 MG TABLET    Take 40 mg by mouth every evening.    FOLIC ACID (FOLVITE) 1 MG TABLET    Take 1 mg by mouth once daily.    TIMOLOL MALEATE 0.5% (TIMOPTIC) 0.5 % DROP    PLACE ONE DROP INTO EACH EYE TWICE DAILY   Modified Medications    No medications on file   Discontinued Medications    CARVEDILOL (COREG) 3.125 MG TABLET    Take 3.125 mg by mouth 2 (two) times a day.

## 2022-06-20 NOTE — TELEPHONE ENCOUNTER
----- Message from Yana Damon MA sent at 6/20/2022  2:29 PM CDT -----    ----- Message -----  From: Chancellor Ayoub  Sent: 6/20/2022   2:12 PM CDT  To: Mikaela Flores Staff    Type:  Needs Medical Advice    Who Called: PT   Would the patient rather a call back or a response via [facility]? Callback   Best Call Back Number: [phone]  Additional Information: Pt requesting callback from office to talk with nurse she saw today at her appt

## 2022-06-20 NOTE — TELEPHONE ENCOUNTER
Pt stated that pharmacy wouldn't give her new prescription of Coreg. Called pharmacy and was told pt had a prescription filled on 06/17 and insurance rejected new prescription. Informed pt to continue to take last prescription until time for refill. Pt verbalized understanding

## 2023-01-23 ENCOUNTER — OFFICE VISIT (OUTPATIENT)
Dept: CARDIOLOGY | Facility: CLINIC | Age: 63
End: 2023-01-23
Payer: MEDICAID

## 2023-01-23 VITALS
OXYGEN SATURATION: 99 % | HEIGHT: 65 IN | HEART RATE: 80 BPM | BODY MASS INDEX: 25.83 KG/M2 | WEIGHT: 155 LBS | DIASTOLIC BLOOD PRESSURE: 85 MMHG | SYSTOLIC BLOOD PRESSURE: 144 MMHG

## 2023-01-23 DIAGNOSIS — I10 ESSENTIAL HYPERTENSION: Chronic | ICD-10-CM

## 2023-01-23 DIAGNOSIS — G45.9 TIA (TRANSIENT ISCHEMIC ATTACK): ICD-10-CM

## 2023-01-23 DIAGNOSIS — Z86.73 HISTORY OF STROKE: Primary | ICD-10-CM

## 2023-01-23 PROCEDURE — 1160F RVW MEDS BY RX/DR IN RCRD: CPT | Mod: CPTII,,, | Performed by: INTERNAL MEDICINE

## 2023-01-23 PROCEDURE — 1159F PR MEDICATION LIST DOCUMENTED IN MEDICAL RECORD: ICD-10-PCS | Mod: CPTII,,, | Performed by: INTERNAL MEDICINE

## 2023-01-23 PROCEDURE — 1159F MED LIST DOCD IN RCRD: CPT | Mod: CPTII,,, | Performed by: INTERNAL MEDICINE

## 2023-01-23 PROCEDURE — 3079F PR MOST RECENT DIASTOLIC BLOOD PRESSURE 80-89 MM HG: ICD-10-PCS | Mod: CPTII,,, | Performed by: INTERNAL MEDICINE

## 2023-01-23 PROCEDURE — 99213 OFFICE O/P EST LOW 20 MIN: CPT | Mod: PBBFAC,PO | Performed by: INTERNAL MEDICINE

## 2023-01-23 PROCEDURE — 4010F ACE/ARB THERAPY RXD/TAKEN: CPT | Mod: CPTII,,, | Performed by: INTERNAL MEDICINE

## 2023-01-23 PROCEDURE — 99214 PR OFFICE/OUTPT VISIT, EST, LEVL IV, 30-39 MIN: ICD-10-PCS | Mod: S$PBB,,, | Performed by: INTERNAL MEDICINE

## 2023-01-23 PROCEDURE — 99999 PR PBB SHADOW E&M-EST. PATIENT-LVL III: ICD-10-PCS | Mod: PBBFAC,,, | Performed by: INTERNAL MEDICINE

## 2023-01-23 PROCEDURE — 3077F PR MOST RECENT SYSTOLIC BLOOD PRESSURE >= 140 MM HG: ICD-10-PCS | Mod: CPTII,,, | Performed by: INTERNAL MEDICINE

## 2023-01-23 PROCEDURE — 1160F PR REVIEW ALL MEDS BY PRESCRIBER/CLIN PHARMACIST DOCUMENTED: ICD-10-PCS | Mod: CPTII,,, | Performed by: INTERNAL MEDICINE

## 2023-01-23 PROCEDURE — 99214 OFFICE O/P EST MOD 30 MIN: CPT | Mod: S$PBB,,, | Performed by: INTERNAL MEDICINE

## 2023-01-23 PROCEDURE — 3077F SYST BP >= 140 MM HG: CPT | Mod: CPTII,,, | Performed by: INTERNAL MEDICINE

## 2023-01-23 PROCEDURE — 99999 PR PBB SHADOW E&M-EST. PATIENT-LVL III: CPT | Mod: PBBFAC,,, | Performed by: INTERNAL MEDICINE

## 2023-01-23 PROCEDURE — 3008F PR BODY MASS INDEX (BMI) DOCUMENTED: ICD-10-PCS | Mod: CPTII,,, | Performed by: INTERNAL MEDICINE

## 2023-01-23 PROCEDURE — 3008F BODY MASS INDEX DOCD: CPT | Mod: CPTII,,, | Performed by: INTERNAL MEDICINE

## 2023-01-23 PROCEDURE — 4010F PR ACE/ARB THEARPY RXD/TAKEN: ICD-10-PCS | Mod: CPTII,,, | Performed by: INTERNAL MEDICINE

## 2023-01-23 PROCEDURE — 3079F DIAST BP 80-89 MM HG: CPT | Mod: CPTII,,, | Performed by: INTERNAL MEDICINE

## 2023-01-23 RX ORDER — LOSARTAN POTASSIUM 50 MG/1
50 TABLET ORAL DAILY
Qty: 30 TABLET | Refills: 11 | Status: SHIPPED | OUTPATIENT
Start: 2023-01-23 | End: 2024-01-23

## 2023-01-23 NOTE — PROGRESS NOTES
Subjective:   @Patient ID:  Aaliyah Duran is a 62 y.o. female who presents for follow-up of HTN       HPI:   Here for f/u   She has been doing ok   No chest pain, no significant SYLVESTER  She was taken off Coreg by Dr. Ugalde.  Unclear reason.  Blood pressure is above goal today.  She never had back from distal hypertension program.  She is compliant with her medications      Historically:  hx of HTN, possible TIA      Prior cardiovascular  Hx  --------------------------------         - ECHO 2019   Low normal left ventricular systolic function. The estimated ejection fraction is 50%  Normal LV diastolic function left ventricular diastolic dysfunction.  Normal right ventricular systolic function.  Mild tricuspid regurgitation.  Mild mitral regurgitation.  The estimated PA systolic pressure is 24 mm Hg  Normal central venous pressure (3 mm Hg).         - EKG 2021 Sr, with no acute ST-T changes. Low voltage qrs           Patient Active Problem List    Diagnosis Date Noted    Essential hypertension 2021    Syncope and collapse 2019    TIA (transient ischemic attack) 2019    History of stroke 2018    Numbness and tingling of right upper and lower extremity 10/04/2018           Right Arm BP - Sittin/85  Left Arm BP - Sittin/85        LAST HbA1c  Lab Results   Component Value Date    HGBA1C 5.4 10/25/2018       Lipid panel  No results found for: CHOL  No results found for: HDL  No results found for: LDLCALC  No results found for: TRIG  No results found for: CHOLHDL         Review of Systems   Constitutional: Negative for chills and fever.   HENT:  Negative for hearing loss and nosebleeds.    Eyes:  Negative for blurred vision.   Cardiovascular:  Negative for chest pain, leg swelling and palpitations.   Respiratory:  Negative for hemoptysis and shortness of breath.    Hematologic/Lymphatic: Negative for bleeding problem.   Skin:  Negative for itching.   Musculoskeletal:  Negative for  falls.   Gastrointestinal:  Negative for abdominal pain and hematochezia.   Genitourinary:  Negative for hematuria.   Neurological:  Negative for dizziness and loss of balance.        Hx of CVA affected the rt side    Psychiatric/Behavioral:  Negative for altered mental status and depression.      Objective:   Physical Exam  Constitutional:       Appearance: She is well-developed.   HENT:      Head: Normocephalic and atraumatic.   Eyes:      Conjunctiva/sclera: Conjunctivae normal.   Neck:      Vascular: No carotid bruit or JVD.   Cardiovascular:      Rate and Rhythm: Normal rate and regular rhythm.      Pulses:           Carotid pulses are 2+ on the right side and 2+ on the left side.       Radial pulses are 2+ on the right side and 2+ on the left side.        Dorsalis pedis pulses are 1+ on the right side and 1+ on the left side.      Heart sounds: Normal heart sounds. No murmur heard.    No friction rub. No gallop.   Pulmonary:      Effort: Pulmonary effort is normal. No respiratory distress.      Breath sounds: Normal breath sounds. No stridor. No wheezing.   Musculoskeletal:      Cervical back: Neck supple.   Skin:     General: Skin is warm and dry.   Neurological:      Mental Status: She is alert and oriented to person, place, and time.   Psychiatric:         Behavior: Behavior normal.       Assessment:     1. History of stroke    2. TIA (transient ischemic attack)    3. Essential hypertension          Plan:   - BP is still above goal. Will increase losartan to 50 mg.  Okay to stay off the carvedilol for time being  - obtain lipid panel results from Dr. Ugalde's office  - Refer to digital HTN clinic       - Continue current medications  - Statin/ASA   I spent 5-10 minutes asking, assessing, assisting, arranging and advising heart healthy diet improvements. This included low-salt meals, portion control and health food alternatives. I also encourage 30 minutes of moderate exercise 3-4x a week.     Pertinent  cardiac images and EKG reviewed independently.    Continue with current medical plan and lifestyle changes.  Return sooner for concerns or questions. If symptoms persist go to the ED  I have reviewed all pertinent data including patient's medical history in detail and updated the computerized patient record.     Orders Placed This Encounter   Procedures    NURSING COMMUNICATION: Create [facility]    [facility]anded [employer] Medicine (HDMP) Enrollment Order     I. PURPOSE  To provide [facility] patients with innovative, specialized blood pressure monitoring and optimal dosing of antihypertensive therapy to improve health outcomes and decrease microvascular and macrovascular complications    II. GOALS  To maintain a systematic, coordinated and cost-effective process to monitor blood pressure in patients with hypertension  To attain and maintain optimal antihypertensive therapy while ensuring patient safety using the most recent evidence-based guidelines for the management of hypertension as reported by AHA/ACC in 2017.   Provide consultative services to providers, patients and caregivers regarding optimal antihypertensive therapy  To improve patient/caregiver understanding and compliance related to antihypertensive therapies by providing continuous patient and caregiver education about their prescribed medications and associated disease state  To provide education, guidance and reinforcement regarding lifestyle modifications including weight loss, adopting and maintaining the Dietary Approaches to Stop Hypertension or DASH diet, sodium restriction, physical activity, and moderation of alcohol consumption to patients and caregivers  Allow providers increased availability of clinic time for direct patient care   To provide patient care and education within an interdisciplinary framework and enhance partnering with other members of health care team  Improve continuity of care for high blood pressure  patients and improve patient engagement  Collect and utilize pharmacy related outcomes to improve quality of patient care    III. COLLABORATIVE PRACTICE AGREEMENT    A.  Under this collaborative practice agreement, an [facility] pharmacist according to and in compliance with [place] Board of Pharmacy Title 46, Part LIII, section 523 and [place] Board of Medical Examiners definition of the Collaborative Drug Therapy Management (CDTM) may initiate, implement, alter and monitor a therapeutic drug plan intended to manage antihypertensive therapy.  Services offered by the hypertension pharmacy specialist may include education on disease state and lifestyle modification, in addition to the drug therapy services listed above. Written and audio/visual educational materials and patient specific information may be provided to improve quality of care.    B. Primary Collaborating Physician:    Primary Physician: Ej Farah M.D., [facility]  , Cardiology  License number: [id number]  CDTM number:  [id number]  Telephone number: [phone]   E-mail address: [email or web address]  Emergency Contact Information: [phone]    Back-Up Physician:  Forrest Paz M.D.  Cardiology  License number:  [id number]  CDTM number:  [id number]  Telephone number:  [phone]  E-mail address: [email or web address]  Emergency Contact Information: [phone]    C.  Pharmacists:   Each of the following pharmacists will serve as the primary pharmacist on CDTM and any pharmacist may serve as the secondary pharmacist for another pharmacists agreement.  Each of the pharmacists listed below has a Pharm.D degree, with completion of an accredited pharmacy practice residency and as well as experience with managing patients along with a physician.  The outpatient monitoring service will be provided under the Cardiology Department at [facility] location in [place] at [address] [place] [address]  [id number]. The pharmacist will contact patients via telephone from a remote location.    Pharmacist: Kenna Arroyo, Luz  This pharmacist has completed a pharmacy practice residency and has practiced clinical pharmacy for 7 years. She has experience in the areas of cardiology, acute care, and managed care. She started a pharmacist run hypertension clinic at [facility] during her residency and was published in The American Journal of Health-System Pharmacists.     [place] Pharmacist License Number: [id number]  CDTM number:  [id number]  Contact Number:  [phone]  Contact E-mail: [email or web address]  Emergency Contact Number:  [phone]    Pharmacist:  Shanda Ayala PharmD  This pharmacist has completed a pharmacy practice residency and has practiced clinical pharmacy for 17 years in the areas of cardiology, geriatric medicine, anticoagulation management, retail and ambulatory care.  She served as a pharmacy practice clinical preceptor and lead pharmacist in anticoagulation clinic for 10 years.  [place] Pharmacist License # [id number]  CDTM number:  [id number]  Contact Number:  [phone]  Contact E-mail:  [email or web address]   Emergency Contact Number:  [phone]    Pharmacist: Christie Hayward PharmD, BCACP, CDE  This pharmacist has completed a pharmacy practice residency with emphasis in ambulatory care and has practiced clinical pharmacy for 8 years in the areas of dyslipidemia, hypertension, diabetes, and anticoagulation. She is also a Certified Diabetes Educator.      [place] Pharmacist License # [id number]  CDTM number: [id number]  Contact number: [phone]  Contact E-mail:  [email or web address]  Emergency Contact Number: [phone]    Pharmacist: Laurence Escobar PharmD  This pharmacist started practicing at [facility] in 2011 following her one year residency at [facility]. She serves as an Adjunct Clinical  in the College of Pharmacy at  [facility]. She served as the lead pharmacist for the Coumadin Clinic team for 4 years.   [place] Pharmacist License: [id number]  CDTM number: [id number]  Contact number: [phone]  Contact E-mail: [email or web address]   Emergency Contact Number: [phone]    Pharmacist:  Eleanor Dawson PharmD  This pharmacist has completed a pharmacy practice residency (PGY-1) and has practiced clinical pharmacy for 16  years in the areas of heart and abdominal transplant, retail and ambulatory care.  She was directly involved in the care of congestive heart failure, left ventricular assist device and heart transplant patients from 0363-2212.  She is currently practicing as a digital medicine clinical specialist in heart failure.    [place] Pharmacist License # [id number]  CDTM number:  [id number]  Contact Number:  [phone]  Contact E-mail:  [email or web address]   Emergency Contact Number:  [phone]    Pharmacist: Laurence Kamara PharmD  This pharmacist obtained her Pharm.D. from [employer], and has completed an Ambulatory Care Pharmacy Practice residency at [facility]. She has practiced clinical pharmacy for 9  years and has experience in the areas of Hypertension and Diabetes.      [place] Pharmacist License: [id number]  CDTM Number: [id number]  Telephone number: [phone]  E-mail: [email or web address]  Emergency Contact Number: [phone]      Pharmacist: Yanet Landeros PharmD  This pharmacist has completed a pharmacy practice residency with an emphasis in ambulatory care and has practiced clinical pharmacy for one year. She has experience in the areas of diabetes, hypertension and dyslipidemia.   [place] Pharmacist License: [id number]  CDTM Number: [id number]  Contact Number: [phone]  Contact Email: [email or web address]   Emergency Contact: [phone]    Pharmacist: Danna Patino, PharmD, BCPS  This pharmacist has  completed a pharmacy practice residency with an emphasis in ambulatory care and is a Board Certified Pharmacotherapy Specialist (BCPS). She has practiced clinical pharmacy for  years in areas of ambulatory care, internal medicine, cardiology and specialty pharmacy.    [place] Pharmacist License Number: [id number]  CDTM number:  [id number]  Contact Number:  [phone]  Contact E-mail:  [email or web address]   Emergency Contact Number:  [phone] d.  Eligible Patients  Patients whose antihypertensive therapy is managed under this agreement must have a diagnosis of hypertension as documented in the patient record, and have established care with a provider within [facility]. All aspects of the patients hypertension medication management will be followed in collaboration with the physician treating the patients hypertension.  The patient will be seen by his or her physician per their discretion or by the recommendation by the hypertension pharmacist.  The patients case may be reviewed with clinic medical personnel as needed, but will be reported at least every 30 days to the collaborating physician regarding the patients drug therapy management. All decisions made by the pharmacist will be recorded in [facility] and are readily available for physician review. All issues outside of the scope of antihypertensive therapy shall be reported to the collaborating physician.     E.  Medications in CDTM  This collaborative practice proposal involves the management of patients who are receiving antihypertensive therapy, specifically, thiazide-type diuretics, angiotensin-converting enzyme inhibtors (ACE-I), angiotensin receptor blockers (ARB), calcium channel blockers (CCB), beta-blockers, loop diuretics, potassium-sparing diuretics, potassium supplementation, aldosterone antagonists, direct renin inhibitors, alpha-1 receptor blockers, central alpha-2 agonists, direct arterial vasodilators and  peripheral adrenergic antoagonists, or those patients in which hypertension is controlled by lifestyle modifcation alone.      F.  Clinical Procedure  All patients will be notified that a hypertension CDTM exists.  A signed physician order will be required for each patient to be enrolled into the hypertension CDTM.  Informed consent and an electronic signature will be obtained from each patient and documented in the patients record.  This patient signature will be valid for 1 year, requiring a new physician order and patient consent yearly.  The patient must also be enrolled in the [employer] communication program ([facility]) to be elegible for the monitoring program.  The following management plan will be utilized for CDTM:    Patients must submit blood pressures at a minimum of once weekly from the patient- purchased wireless blood pressure cuff. Patients who fail to comply with this requirement are subject to removal from [facility].   Evaluate the change in blood pressure, if any, from previous measurements performed on the same cuff and arm.  Determine and document contributing factors for blood pressure change.    Review initial drug therapy made by clinic physician upon program enrollment with patient to ensure compliance.    Identify target blood pressure based on age and comorbidities. Therapeutic changes will be made in collaboration with PharmD and collaborating physician based on the AHA/ACC 2017 guidelines for the treatment of hypertension.  Guide drug optimization based on patient response at 2-4 week intervals until control is achieved.  The PharmD will continue to monitor blood pressure and make adjustments to hypertension medications in order to achieve optimal blood pressure.   Order follow up labs when changing or adding ACE inhibitors and diuretics.    Refer to hypertension specialist if  blood pressure goals cannot be achieved using the noted algorithm.  Notify physician with specific problems or  request clinic visit if deemed necessary.  Document antihypertensive therapy changes, interventions, and outcomes in EMR.   Provide patient/caregiver continuous education or reinforcement regarding hypertension management,  medications and lifestyle modifications.    Laboratory Tests  Pharmacists will be authorized to order and evaluate laboratory tests directly related to the disease specific drug therapy being managed. Pharmacists will also be authorized to order additional specific labs based on patient clinical presentation or description. Pharmacists may also review other lab data available in the patient record which may be necessary for the evaluation and assessment of the impact on antihypertensive therapy (i.e. drug interaction, disease interaction, etc.).     b.  Documentation   Documentation of patient encounters and lab results will be permanently placed in the [facility].  Laboratory results will automatically populate prompting review and assessment of each patient.  Laboratory results obtained from outside laboratories will be entered into EMR manually.  This documentation will include lab values, medication changes, identification and assessment of adverse events related to therapy, antihypertensive medication dose adjustments, therapeutic management plan and follow up as well as any other information given to the patient during the telemedicine visit.    c.     1.   will be carried out through quarterly reports tracking following statistics:   a. Percent of patients requiring a change to antihypertensive medications   b. Number of emergency visits due to hypertensive urgency or emergency, hypotension or medication side effects for participating patients  Percent of patients who are controlled (BP <130/80 mmHg)  and uncontrolled (BP>130 mmHg)     2.  Patient specific quality indicators will be identified through quarterly review of the following data:   a.  Average  change in blood pressure after a dose adjument by the hypertension pharmacist    3.  A random sample of patient records shall be reviewed by the primary physician quarterly to ensure adherence by the hypertension clinical specialists to the collaborative practice agreement.     4.   measures will be reported to Pharmacy & Therapeutics Committee yearly.     References:    QUINTON Canela, et al. 2017. 2017. Guidelines for the Prevention, Detection, Evaluation and Management of High Blood Pressure in Adults.      Order Specific Question:   BP Control Goal     Answer:   Current (2017) AHA Guidelines       Follow up as scheduled.     She expressed verbal understanding and agreed with the plan    Patient's Medications   New Prescriptions    No medications on file   Previous Medications    ASPIRIN (ECOTRIN) 325 MG EC TABLET    Take 81 mg by mouth once daily.    ATORVASTATIN (LIPITOR) 40 MG TABLET    Take 40 mg by mouth every evening.    FOLIC ACID (FOLVITE) 1 MG TABLET    Take 1 mg by mouth once daily.    TIMOLOL MALEATE 0.5% (TIMOPTIC) 0.5 % DROP    PLACE ONE DROP INTO EACH EYE TWICE DAILY   Modified Medications    Modified Medication Previous Medication    LOSARTAN (COZAAR) 50 MG TABLET losartan (COZAAR) 25 MG tablet       Take 1 tablet (50 mg total) by mouth once daily.    Take 1 tablet (25 mg total) by mouth once daily.   Discontinued Medications    CARVEDILOL (COREG) 6.25 MG TABLET    Take 1 tablet (6.25 mg total) by mouth 2 (two) times daily with meals.

## 2023-06-26 ENCOUNTER — TELEPHONE (OUTPATIENT)
Dept: CARDIOLOGY | Facility: CLINIC | Age: 63
End: 2023-06-26
Payer: MEDICAID

## 2023-06-26 NOTE — PROGRESS NOTES
Pt has no active cardiac condition (ACS/USA, decompenstated CHF, significant arrhythmias or severe valvular disease). Patient has acceptable risk for moderate risk non cardiovascular surgery. No further cardiac work up required prior to  undergoing the surgical procedure.  These recommendations follow the 2014 ACC/AHA Guideline on Perioperative Cardiovascular Evaluation and Management of Patients Undergoing Noncardiac Surgery. (JACC Vol. 64 No. 22, Dec 9, 2014, pp q11-r818).       Sincerely,  Mark Al MD.   Interventional Cardiologist  [facility] [place]

## 2023-06-26 NOTE — TELEPHONE ENCOUNTER
----- Message from Mark Al MD sent at 6/26/2023 12:34 PM CDT -----  OK to proceed. Thanks   ----- Message -----  From: Yana Damon MA  Sent: 6/22/2023   5:05 PM CDT  To: Mark Al MD    Do you need to see her or can it be done by chart?   ----- Message -----  From: Leslie Doyle RN  Sent: 6/21/2023   5:07 PM CDT  To: Yana Damon MA      ----- Message -----  From: Rebecca Dalal  Sent: 6/21/2023   4:36 PM CDT  To: [facility] Flores Staff    Type:  Needs Medical Advice    Who Called:  Summer Ugalde [facility]  Symptoms (please be specific):    How long has patient had these symptoms:    Pharmacy name and phone #:    Would the patient rather a call back or a response via [facility]?   Best Call Back Number: [phone]  Additional Information: f/u on prev msg from yesterday - needs medical clearance for mastectomy

## 2025-02-18 ENCOUNTER — OFFICE VISIT (OUTPATIENT)
Dept: SURGERY | Facility: CLINIC | Age: 65
End: 2025-02-18
Payer: MEDICAID

## 2025-02-18 DIAGNOSIS — Z85.3 PERSONAL HISTORY OF BREAST CANCER: Primary | ICD-10-CM

## 2025-02-18 PROCEDURE — 99212 OFFICE O/P EST SF 10 MIN: CPT | Mod: PBBFAC | Performed by: SURGERY

## 2025-02-18 RX ORDER — LIDOCAINE HYDROCHLORIDE 10 MG/ML
1 INJECTION, SOLUTION EPIDURAL; INFILTRATION; INTRACAUDAL; PERINEURAL ONCE
Status: SHIPPED | OUTPATIENT
Start: 2025-02-18

## 2025-02-18 RX ORDER — SODIUM CHLORIDE, SODIUM LACTATE, POTASSIUM CHLORIDE, CALCIUM CHLORIDE 600; 310; 30; 20 MG/100ML; MG/100ML; MG/100ML; MG/100ML
INJECTION, SOLUTION INTRAVENOUS CONTINUOUS
OUTPATIENT
Start: 2025-02-18

## 2025-02-18 NOTE — PROGRESS NOTES
Subjective:       Patient ID: Aaliyah Duran is a 64 y.o. female.    Chief Complaint: No chief complaint on file.    Review of patient's allergies indicates:  No Known Allergies  Patient with personal history of left breast cancer.  She had partial mastectomy and then had chemo and radiation.  She was referred to me by her oncologist because her port needs to be removed.  It is not being flushed or use so needs to come out.  Into the right subclavian type port.  I told this will be done in the operating room under some sedation and local anesthesia.  She agrees and freely signed informed consent.  History reviewed. No pertinent past medical history.  History reviewed. No pertinent surgical history.  No family history on file.  Social History[1]  There were no vitals filed for this visit.    Review of Systems   All other systems reviewed and are negative.    Objective:      Physical Exam  Constitutional:       Appearance: She is well-developed.   HENT:      Head: Normocephalic.   Eyes:      Pupils: Pupils are equal, round, and reactive to light.   Pulmonary:      Effort: Pulmonary effort is normal.   Abdominal:      Palpations: Abdomen is soft.   Musculoskeletal:         General: Normal range of motion.      Cervical back: Normal range of motion.   Skin:     General: Skin is warm and dry.   Neurological:      Mental Status: She is alert and oriented to person, place, and time.       Assessment:       1. Personal history of breast cancer        Plan:         Diagnoses and all orders for this visit:    Personal history of breast cancer    Patient will be scheduled for right Port-A-Cath removal.    No follow-ups on file.          He Joyce Jr, MD           [1]   Social History  Socioeconomic History    Marital status: Single   Tobacco Use    Smoking status: Never

## 2025-03-03 ENCOUNTER — HOSPITAL ENCOUNTER (OUTPATIENT)
Facility: HOSPITAL | Age: 65
Discharge: HOME | End: 2025-03-03
Attending: SURGERY | Admitting: SURGERY
Payer: MEDICAID

## 2025-03-03 ENCOUNTER — ANESTHESIA EVENT (OUTPATIENT)
Dept: SURGERY | Facility: HOSPITAL | Age: 65
End: 2025-03-03
Payer: MEDICAID

## 2025-03-03 ENCOUNTER — ANESTHESIA (OUTPATIENT)
Dept: SURGERY | Facility: HOSPITAL | Age: 65
End: 2025-03-03
Payer: MEDICAID

## 2025-03-03 VITALS
DIASTOLIC BLOOD PRESSURE: 66 MMHG | SYSTOLIC BLOOD PRESSURE: 121 MMHG | RESPIRATION RATE: 14 BRPM | OXYGEN SATURATION: 97 % | HEART RATE: 59 BPM

## 2025-03-03 DIAGNOSIS — Z85.3 PERSONAL HISTORY OF BREAST CANCER: Primary | ICD-10-CM

## 2025-03-03 PROCEDURE — 36590 REMOVAL TUNNELED CV CATH: CPT | Mod: RT,,, | Performed by: SURGERY

## 2025-03-03 PROCEDURE — D9220AH HC ANESTHESIA PROFESSIONAL FEE: Mod: QZ | Performed by: NURSE ANESTHETIST, CERTIFIED REGISTERED

## 2025-03-03 PROCEDURE — 36000707: Performed by: SURGERY

## 2025-03-03 PROCEDURE — 71000033 HC RECOVERY, INTIAL HOUR: Performed by: SURGERY

## 2025-03-03 PROCEDURE — 37000008 HC ANESTHESIA 1ST 15 MINUTES: Performed by: SURGERY

## 2025-03-03 PROCEDURE — 37000009 HC ANESTHESIA EA ADD 15 MINS: Performed by: SURGERY

## 2025-03-03 PROCEDURE — 63600175 PHARM REV CODE 636 W HCPCS: Performed by: SURGERY

## 2025-03-03 PROCEDURE — 63600175 PHARM REV CODE 636 W HCPCS: Performed by: NURSE ANESTHETIST, CERTIFIED REGISTERED

## 2025-03-03 PROCEDURE — 00400 ANES INTEGUMENTARY SYS NOS: CPT | Mod: QZ | Performed by: NURSE ANESTHETIST, CERTIFIED REGISTERED

## 2025-03-03 PROCEDURE — 36000706: Performed by: SURGERY

## 2025-03-03 RX ORDER — TRAMADOL HYDROCHLORIDE 50 MG/1
50 TABLET ORAL EVERY 4 HOURS PRN
Status: DISCONTINUED | OUTPATIENT
Start: 2025-03-03 | End: 2025-03-03 | Stop reason: HOSPADM

## 2025-03-03 RX ORDER — SODIUM CHLORIDE, SODIUM LACTATE, POTASSIUM CHLORIDE, CALCIUM CHLORIDE 600; 310; 30; 20 MG/100ML; MG/100ML; MG/100ML; MG/100ML
INJECTION, SOLUTION INTRAVENOUS CONTINUOUS PRN
Status: DISCONTINUED | OUTPATIENT
Start: 2025-03-03 | End: 2025-03-03

## 2025-03-03 RX ORDER — ONDANSETRON HYDROCHLORIDE 2 MG/ML
INJECTION, SOLUTION INTRAMUSCULAR; INTRAVENOUS
Status: DISCONTINUED | OUTPATIENT
Start: 2025-03-03 | End: 2025-03-03

## 2025-03-03 RX ORDER — LIDOCAINE HYDROCHLORIDE 20 MG/ML
INJECTION, SOLUTION EPIDURAL; INFILTRATION; INTRACAUDAL; PERINEURAL
Status: DISCONTINUED | OUTPATIENT
Start: 2025-03-03 | End: 2025-03-03

## 2025-03-03 RX ORDER — LIDOCAINE HYDROCHLORIDE AND EPINEPHRINE 10; 10 UG/ML; MG/ML
INJECTION, SOLUTION INFILTRATION; PERINEURAL
Status: DISCONTINUED | OUTPATIENT
Start: 2025-03-03 | End: 2025-03-03 | Stop reason: HOSPADM

## 2025-03-03 RX ORDER — KETOROLAC TROMETHAMINE 30 MG/ML
INJECTION, SOLUTION INTRAMUSCULAR; INTRAVENOUS
Status: DISCONTINUED | OUTPATIENT
Start: 2025-03-03 | End: 2025-03-03

## 2025-03-03 RX ORDER — PROPOFOL 10 MG/ML
VIAL (ML) INTRAVENOUS
Status: DISCONTINUED | OUTPATIENT
Start: 2025-03-03 | End: 2025-03-03

## 2025-03-03 RX ORDER — OXYCODONE HYDROCHLORIDE 5 MG/1
5 TABLET ORAL EVERY 4 HOURS PRN
Status: DISCONTINUED | OUTPATIENT
Start: 2025-03-03 | End: 2025-03-03 | Stop reason: HOSPADM

## 2025-03-03 RX ORDER — SODIUM CHLORIDE 9 MG/ML
INJECTION, SOLUTION INTRAVENOUS CONTINUOUS
Status: DISCONTINUED | OUTPATIENT
Start: 2025-03-03 | End: 2025-03-03 | Stop reason: HOSPADM

## 2025-03-03 RX ORDER — SODIUM CHLORIDE, SODIUM LACTATE, POTASSIUM CHLORIDE, CALCIUM CHLORIDE 600; 310; 30; 20 MG/100ML; MG/100ML; MG/100ML; MG/100ML
INJECTION, SOLUTION INTRAVENOUS CONTINUOUS
Status: DISCONTINUED | OUTPATIENT
Start: 2025-03-03 | End: 2025-03-03 | Stop reason: HOSPADM

## 2025-03-03 RX ORDER — OXYCODONE AND ACETAMINOPHEN 5; 325 MG/1; MG/1
1 TABLET ORAL EVERY 4 HOURS PRN
Qty: 10 TABLET | Refills: 0 | Status: SHIPPED | OUTPATIENT
Start: 2025-03-03

## 2025-03-03 RX ORDER — HYDROMORPHONE HYDROCHLORIDE 1 MG/ML
1 INJECTION, SOLUTION INTRAMUSCULAR; INTRAVENOUS; SUBCUTANEOUS EVERY 4 HOURS PRN
Status: DISCONTINUED | OUTPATIENT
Start: 2025-03-03 | End: 2025-03-03 | Stop reason: HOSPADM

## 2025-03-03 RX ORDER — ONDANSETRON HYDROCHLORIDE 2 MG/ML
4 INJECTION, SOLUTION INTRAVENOUS EVERY 12 HOURS PRN
Status: DISCONTINUED | OUTPATIENT
Start: 2025-03-03 | End: 2025-03-03 | Stop reason: HOSPADM

## 2025-03-03 RX ORDER — MIDAZOLAM HYDROCHLORIDE 1 MG/ML
INJECTION INTRAMUSCULAR; INTRAVENOUS
Status: DISCONTINUED | OUTPATIENT
Start: 2025-03-03 | End: 2025-03-03

## 2025-03-03 RX ORDER — FENTANYL CITRATE 50 UG/ML
INJECTION, SOLUTION INTRAMUSCULAR; INTRAVENOUS
Status: DISCONTINUED | OUTPATIENT
Start: 2025-03-03 | End: 2025-03-03

## 2025-03-03 RX ADMIN — PROPOFOL 100 MG: 10 INJECTION, EMULSION INTRAVENOUS at 08:03

## 2025-03-03 RX ADMIN — KETOROLAC TROMETHAMINE 30 MG: 30 INJECTION, SOLUTION INTRAMUSCULAR at 08:03

## 2025-03-03 RX ADMIN — FENTANYL CITRATE 100 MCG: 0.05 INJECTION, SOLUTION INTRAMUSCULAR; INTRAVENOUS at 08:03

## 2025-03-03 RX ADMIN — ONDANSETRON 4 MG: 2 INJECTION INTRAMUSCULAR; INTRAVENOUS at 08:03

## 2025-03-03 RX ADMIN — LIDOCAINE HYDROCHLORIDE 50 MG: 20 INJECTION, SOLUTION EPIDURAL; INFILTRATION; INTRACAUDAL; PERINEURAL at 08:03

## 2025-03-03 RX ADMIN — MIDAZOLAM HYDROCHLORIDE 2 MG: 1 INJECTION, SOLUTION INTRAMUSCULAR; INTRAVENOUS at 08:03

## 2025-03-03 RX ADMIN — SODIUM CHLORIDE, SODIUM LACTATE, POTASSIUM CHLORIDE, AND CALCIUM CHLORIDE: .6; .31; .03; .02 INJECTION, SOLUTION INTRAVENOUS at 08:03

## 2025-03-03 RX ADMIN — PROPOFOL 50 MG: 10 INJECTION, EMULSION INTRAVENOUS at 08:03

## 2025-03-03 NOTE — TRANSFER OF CARE
Anesthesia Transfer of Care Note    Patient: Aaliyah Duran    Procedure(s) Performed: Procedure(s) (LRB):  REMOVAL, CATHETER, CENTRAL VENOUS, TUNNELED (N/A)    Patient location: PACU    Anesthesia Type: general    Transport from OR: Transported from OR on 6-10 L/min O2 by face mask with adequate spontaneous ventilation    Post pain: adequate analgesia    Post assessment: no apparent anesthetic complications and tolerated procedure well    Post vital signs: stable    Level of consciousness: sedated    Nausea/Vomiting: no nausea/vomiting    Complications: none    Transfer of care protocol was followed      Last vitals: Visit Vitals  /69   Pulse 63   Resp 14   SpO2 100%   Breastfeeding No

## 2025-03-03 NOTE — ANESTHESIA POSTPROCEDURE EVALUATION
Anesthesia Post Evaluation    Patient: Aaliyah Duran    Procedure(s) Performed: Procedure(s) (LRB):  REMOVAL, CATHETER, CENTRAL VENOUS, TUNNELED (N/A)    Final Anesthesia Type: general      Patient location during evaluation: PACU  Patient participation: Yes- Able to Participate  Level of consciousness: awake and alert, oriented and awake  Post-procedure vital signs: reviewed and stable  Pain management: adequate  Airway patency: patent    PONV status at discharge: No PONV  Anesthetic complications: no      Cardiovascular status: blood pressure returned to baseline, hemodynamically stable and stable  Respiratory status: unassisted, spontaneous ventilation and room air  Hydration status: euvolemic  Follow-up not needed.              Vitals Value Taken Time   /66 03/03/25 09:02   Temp 37 03/03/25 10:11   Pulse 59 03/03/25 09:02   Resp 14 03/03/25 09:02   SpO2 97 % 03/03/25 09:02         Event Time   Out of Recovery 09:11:32         Pain/Familia Score: Familia Score: 10 (3/3/2025  9:00 AM)

## 2025-03-03 NOTE — OP NOTE
[facility] - Surgery  Operative Note      Date of Procedure: 3/3/2025     Procedure: Procedure(s) (LRB):  REMOVAL, CATHETER, CENTRAL VENOUS, TUNNELED (N/A)     Surgeons and Role:     * He Joyce Jr., MD - Primary    Assisting Surgeon: None    Pre-Operative Diagnosis: Personal history of breast cancer [Z85.3]    Post-Operative Diagnosis: Post-Op Diagnosis Codes:     * Personal history of breast cancer [Z85.3]    Anesthesia: General    Operative Findings (including complications, if any):  Right port removal    Description of Technical Procedures:  Patient brought to the operating room.  Time-out performed.  Monitored anesthesia was administered.  Right chest wall was prepped and draped in standard surgical fashion.  Local anesthetic was injected over the old incision.  Scalpel was used to go through the old incision down to the capsule.  The capsule was opened with cautery.  The port was grasped and the sutures were cut and removed.  The port and catheter came out without any difficulty.  There was no bleeding.  The incision was then closed with 3-0 Vicryl interrupted subcuticular.  Dermabond glue.    Significant Surgical Tasks Conducted by the Assistant(s), if Applicable:     Estimated Blood Loss (EBL):  Minimal           Implants:  None    Specimens:  None  Specimen (24h ago, onward)      None                    Condition: Good    Disposition: PACU - hemodynamically stable.    Attestation: I performed the procedure.    Discharge Note    OUTCOME: Patient tolerated treatment/procedure well without complication and is now ready for discharge.    DISPOSITION: Home or Self Care    FINAL DIAGNOSIS:  Personal history of breast cancer    FOLLOWUP: With primary care provider    DISCHARGE INSTRUCTIONS:    Discharge Procedure Orders   Diet general     No dressing needed     Activity as tolerated

## 2025-03-03 NOTE — BRIEF OP NOTE
[facility] - Surgery  Brief Operative Note    Surgery Date: 3/3/2025     Surgeons and Role:     * He Joyce Jr., MD - Primary    Assisting Surgeon: None    Pre-op Diagnosis:  Personal history of breast cancer [Z85.3]    Post-op Diagnosis:  Post-Op Diagnosis Codes:     * Personal history of breast cancer [Z85.3]    Procedure(s) (LRB):  REMOVAL, CATHETER, CENTRAL VENOUS, TUNNELED (N/A)    Anesthesia: General    Operative Findings:  Right port removal    Estimated Blood Loss:  Minimal         Specimens:  None  Specimen (24h ago, onward)      None              Discharge Note    OUTCOME: Patient tolerated treatment/procedure well without complication and is now ready for discharge.    DISPOSITION: Home or Self Care    FINAL DIAGNOSIS:  Personal history of breast cancer    FOLLOWUP: With primary care provider    DISCHARGE INSTRUCTIONS:    Discharge Procedure Orders   Diet general     No dressing needed     Activity as tolerated

## 2025-03-03 NOTE — ANESTHESIA PREPROCEDURE EVALUATION
03/03/2025  Aaliyah Duran is a 64 y.o., female.      Pre-op Assessment    I have reviewed the Patient Summary Reports.     I have reviewed the Nursing Notes. I have reviewed the NPO Status.   I have reviewed the Medications.     Review of Systems  Anesthesia Hx:  No problems with previous Anesthesia                Social:  Non-Smoker, No Alcohol Use       Hematology/Oncology:  Hematology Normal   Oncology Normal                                   EENT/Dental:  EENT/Dental Normal           Cardiovascular:  Exercise tolerance: good   Hypertension                                          Pulmonary:  Pulmonary Normal                       Renal/:  Renal/ Normal                 Hepatic/GI:  Hepatic/GI Normal                    Musculoskeletal:  Musculoskeletal Normal                Neurological:  TIA,                                     Endocrine:  Endocrine Normal            Dermatological:  Skin Normal    Psych:  Psychiatric Normal                  Physical Exam  General: Well nourished    Airway:  Mallampati: II   Mouth Opening: Normal  TM Distance: Normal  Tongue: Normal  Neck ROM: Normal ROM    Chest/Lungs:  Clear to auscultation    Heart:  Rate: Normal  Rhythm: Regular Rhythm  Sounds: Normal      Anesthesia Plan  Type of Anesthesia, risks & benefits discussed:    Anesthesia Type: MAC, Gen Natural Airway  Intra-op Monitoring Plan: Standard ASA Monitors  Post Op Pain Control Plan: multimodal analgesia  Induction:  IV  Airway Plan: Direct  Informed Consent: Informed consent signed with the Patient and all parties understand the risks and agree with anesthesia plan.  All questions answered.   ASA Score: 3  Day of Surgery Review of History & Physical: H&P Update referred to the surgeon/provider.I have interviewed and examined the patient. I have reviewed the patient's H&P dated: 3/3/25. There are no significant  changes.     Ready For Surgery From Anesthesia Perspective.     .

## (undated) DEVICE — SUT MONOCYRL 4-0 PS2 UND

## (undated) DEVICE — TRAP FLUID SMOKE EVACUATOR

## (undated) DEVICE — SUT 3-0 VICRYL / SH (J416)

## (undated) DEVICE — Device

## (undated) DEVICE — PENCIL SMK EVAC CONNECTOR 10FT